# Patient Record
(demographics unavailable — no encounter records)

---

## 2024-12-18 NOTE — HISTORY OF PRESENT ILLNESS
[Patient reported mammogram was normal] : Patient reported mammogram was normal [Patient reported breast sonogram was normal] : Patient reported breast sonogram was normal [Patient reported PAP Smear was normal] : Patient reported PAP Smear was normal [Patient reported bone density results were abnormal] : Patient reported bone density results were abnormal [Patient reported colonoscopy was normal] : Patient reported colonoscopy was normal [Currently Active] : currently active [Men] : men [No] : No [Patient refuses STI testing] : Patient refuses STI testing [FreeTextEntry1] : Marcela Maya presents for a well women visit and GYN exam. pain in her lower abdomen and started urinating a lot.  [Mammogramdate] : 2024 [BreastSonogramDate] : 2024 [PapSmeardate] : 2023 [BoneDensityDate] : 2021 [ColonoscopyDate] : 2022

## 2024-12-18 NOTE — PHYSICAL EXAM
[Chaperone Present] : A chaperone was present in the examining room during all aspects of the physical examination [93901] : A chaperone was present during the pelvic exam. [Appropriately responsive] : appropriately responsive [Alert] : alert [No Acute Distress] : no acute distress [Regular Rate Rhythm] : regular rate rhythm [Clear to Auscultation B/L] : clear to auscultation bilaterally [Soft] : soft [Non-tender] : non-tender [Non-distended] : non-distended [No Lesions] : no lesions [No Mass] : no mass [Oriented x3] : oriented x3 [Examination Of The Breasts] : a normal appearance [No Masses] : no breast masses were palpable [Labia Majora] : normal [Labia Minora] : normal [Normal] : normal [Atrophy] : atrophy [Absent] : absent [FreeTextEntry2] : carito

## 2024-12-18 NOTE — PLAN
Detail Level: Detailed
Add 1585x Cpt? (Do Not Bill If You Billed For The Procedure Placing The Sutures. This Is An Add-On Code That Must Be Billed With An E/M Visit Code): No
[FreeTextEntry1] : annual: pap and hpv  dexa ordered mammogram nl in june up to date colonoscopy reminded to see PMD, may see a new one  lengthy d/w pt regarding hrt s/p hysterectomy r/b/a of estrogen HT (does not need progestin) d/w tp at length  risks including but not limited to increase clots (stroke/ MI) br cancer though that was not observed in estrogen only arm of [facility] study d/w pt possible headaches can be a side effect needs 3 month med follow up if goes on this medication

## 2025-07-25 NOTE — PLAN
[FreeTextEntry1] : lengthy d/w pt regarding hrt s/p hysterectomy age 48 or so, did have mood swings  then r/b/a of estrogen HT (does not need progestin) d/w tp at length  was taking a natural estrogen in past *took for a few days but felt a little strange, very mild weak did have mild headache but nothing significant risks including but not limited to increase clots (stroke/ MI) br cancer though that was not observed in estrogen only arm of [facility] study d/w pt possible headaches can be a side effect  Over 50% of face to face time visit counseling and coordination of care.(total with documentation 30m)